# Patient Record
(demographics unavailable — no encounter records)

---

## 2025-07-23 NOTE — CONSULT LETTER
[Dear  ___] : Dear  [unfilled], [Consult Letter:] : I had the pleasure of evaluating your patient, [unfilled]. [Please see my note below.] : Please see my note below. [Consult Closing:] : Thank you very much for allowing me to participate in the care of this patient.  If you have any questions, please do not hesitate to contact me. [Sincerely,] : Sincerely, [FreeTextEntry3] : Abraham Juan MD FACS

## 2025-07-23 NOTE — HISTORY OF PRESENT ILLNESS
[de-identified] : Patient with h/o FRANCIS - uses CPAP, bilateral SNHL - wears amplification, tinnitus, reflux, dysphagia, and gustatory rhinitis presents today for an annual visit. Patient states his hearing aids have been doing good. Uses CPAP.

## 2025-07-23 NOTE — PHYSICAL EXAM
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 positive angeles [de-identified] : elongated uvula [FreeTextEntry2] : sinuses nontender to percussion sensations intact

## 2025-07-23 NOTE — ASSESSMENT
[FreeTextEntry1] : Reviewed and reconciled medications, allergies, PMHx, PSHx, SocHx, FMHx.      Patient with h/o FRANCIS - uses CPAP, bilateral SNHL - wears amplification, tinnitus, reflux, dysphagia, and gustatory rhinitis presents today for an annual visit. Patient states his hearing aids have been doing good. Uses CPAP.  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 cerumen removed via curettage severe deviation of septum to right. Mildly deviated to left. elongated uvula   Compliance Report: 100% compliant AHIL 5.3  average 8 hours  Plan: Audio - results interpreted by Dr. Juan and reviewed with the patient. Reviewed compliance report   -FU 1 year

## 2025-07-23 NOTE — ADDENDUM
[FreeTextEntry1] : Documented by Jovanny Sanchez acting as scribe for Dr. Juan on 07/23/2025. All Medical record entries made by the Scribe were at my, Dr. Juan, direction and personally dictated by me on 07/23/2025 . I have reviewed the chart and agree that the record accurately reflects my personal performance of the history, physical exam, assessment and plan. I have also personally directed, reviewed, and agreed with the discharge instructions.